# Patient Record
Sex: FEMALE | Race: WHITE | NOT HISPANIC OR LATINO | Employment: STUDENT | URBAN - METROPOLITAN AREA
[De-identification: names, ages, dates, MRNs, and addresses within clinical notes are randomized per-mention and may not be internally consistent; named-entity substitution may affect disease eponyms.]

---
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
## 2021-05-18 ENCOUNTER — OFFICE VISIT (OUTPATIENT)
Dept: FAMILY MEDICINE CLINIC | Facility: CLINIC | Age: 21
End: 2021-05-18

## 2021-05-18 VITALS
TEMPERATURE: 98.7 F | HEART RATE: 84 BPM | DIASTOLIC BLOOD PRESSURE: 70 MMHG | HEIGHT: 64 IN | SYSTOLIC BLOOD PRESSURE: 100 MMHG | RESPIRATION RATE: 16 BRPM | BODY MASS INDEX: 21.68 KG/M2 | WEIGHT: 127 LBS

## 2021-05-18 DIAGNOSIS — Z00.00 ANNUAL PHYSICAL EXAM: Primary | ICD-10-CM

## 2021-05-18 PROCEDURE — 1036F TOBACCO NON-USER: CPT | Performed by: FAMILY MEDICINE

## 2021-05-18 PROCEDURE — 3008F BODY MASS INDEX DOCD: CPT | Performed by: FAMILY MEDICINE

## 2021-05-18 PROCEDURE — 99395 PREV VISIT EST AGE 18-39: CPT | Performed by: FAMILY MEDICINE

## 2021-05-18 PROCEDURE — 3725F SCREEN DEPRESSION PERFORMED: CPT | Performed by: FAMILY MEDICINE

## 2021-05-18 PROCEDURE — 86580 TB INTRADERMAL TEST: CPT

## 2021-05-18 NOTE — PROGRESS NOTES
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  - discussed with patient that I would recommend birth control and to make follow up to discuss further options  - patient was placed on winter previously but stopped due to side effects of dizziness  - receiving ppd today  - physical forms filled out for patient and returned at visit               Immunizations and preventive care screenings were discussed with patient today  Appropriate education was printed on patient's after visit summary  Counseling:   Alcohol/drug use: discussed moderation in alcohol intake, the recommendations for healthy alcohol use, and avoidance of illicit drug use  Dental Health: discussed importance of regular tooth brushing, flossing, and dental visits  Injury prevention: discussed safety/seat belts, safety helmets, smoke detectors, carbon dioxide detectors, and smoking near bedding or upholstery  Sexual health: discussed sexually transmitted diseases, partner selection, use of condoms, avoidance of unintended pregnancy, and contraceptive alternatives  · Exercise: the importance of regular exercise/physical activity was discussed  Recommend exercise 3-5 times per week for at least 30 minutes  Return in about 1 year (around 2022) for Annual physical      Chief Complaint:     Chief Complaint   Patient presents with    Physical Exam      History of Present Illness:     Adult Annual Physical   Patient here for a comprehensive physical exam  The patient reports problems - continued abdominal sharp stabbing pain following meals    Patient has tried probiotic, omeprazole, and ranitidine without lasting improvement in pain  She admits to worsening pain with potatoes, tomatoes, and dairy  She would like a referral to GI  Denies black tarry stool, blood in bm, or hematemesis  Patient had an EGD in 2017 which was normal and negative for h pylori  She states that the pain is getting worse over the years  Diet and Physical Activity  · Diet/Nutrition: well balanced diet and consuming 3-5 servings of fruits/vegetables daily  · Exercise: walking, 5-7 times a week on average and 30-60 minutes on average  Depression Screening  PHQ-9 Depression Screening    PHQ-9:   Frequency of the following problems over the past two weeks:      Little interest or pleasure in doing things: 0 - not at all  Feeling down, depressed, or hopeless: 0 - not at all  PHQ-2 Score: 0       General Health  · Sleep: sleeps well and gets 7-8 hours of sleep on average  · Hearing: normal - bilateral   · Vision: no vision problems  · Dental: no dental visits for >1 year, brushes teeth twice daily and flosses teeth occasionally  /GYN Health  · Last menstrual period: 4/20/2021  · Contraceptive method: none  · History of STDs?: no      Review of Systems:     Review of Systems   Constitutional: Negative for activity change, chills, diaphoresis and fever  HENT: Negative for ear pain, hearing loss, postnasal drip, rhinorrhea, sinus pressure, sinus pain, sneezing and sore throat  Respiratory: Negative for cough, chest tightness, shortness of breath and wheezing  Cardiovascular: Negative for chest pain, palpitations and leg swelling  Gastrointestinal: Negative for abdominal pain, blood in stool, constipation, diarrhea, nausea and vomiting  Genitourinary: Negative for dysuria, frequency, hematuria and urgency  Musculoskeletal: Negative for arthralgias and myalgias  Neurological: Negative for dizziness, syncope, weakness, light-headedness, numbness and headaches        Past Medical History:     Past Medical History: Diagnosis Date    Concussion     last assessed: 10/29/2014      Past Surgical History:     Past Surgical History:   Procedure Laterality Date    CT EGD TRANSORAL BIOPSY SINGLE/MULTIPLE N/A 2/28/2017    Procedure: ESOPHAGOGASTRODUODENOSCOPY (EGD); Surgeon: Sonya Candelario MD;  [facility]: [facility];   Service: Pediatric Gastrointestinal      Social History:     E-Cigarette/Vaping    E-Cigarette Use Never User      E-Cigarette/Vaping Substances    Nicotine No     THC No     CBD No     Flavoring No     Other No     Unknown No      Social History     Socioeconomic History    Marital status: Single     Spouse name: None    Number of children: None    Years of education: None    Highest education level: None   Occupational History    None   Social Needs    Financial resource strain: None    Food insecurity     Worry: None     Inability: None    Transportation needs     Medical: None     Non-medical: None   Tobacco Use    Smoking status: Never Smoker    Smokeless tobacco: Never Used   Substance and Sexual Activity    Alcohol use: No    Drug use: No    Sexual activity: Yes   Lifestyle    Physical activity     Days per week: None     Minutes per session: None    Stress: None   Relationships    Social connections     Talks on phone: None     Gets together: None     Attends Bahai service: None     Active member of club or organization: None     Attends meetings of clubs or organizations: None     Relationship status: None    Intimate partner violence     Fear of current or ex partner: None     Emotionally abused: None     Physically abused: None     Forced sexual activity: None   Other Topics Concern    None   Social History Narrative    Patient lives with mom,dad, and brother     Always wears seatbelt in car       Family History:     Family History   Problem Relation Age of Onset    Thyroid nodules Mother    [facility] Migraines Mother     Ovarian cancer Mother     Thyroid disease Mother     Cancer Maternal Grandmother     Thyroid nodules Maternal Grandmother     Brain cancer Family     Breast cancer Family     Stomach cancer Family     Hypertension Family       Current Medications:     No current outpatient medications on file  No current facility-administered medications for this visit  Allergies:     No Known Allergies   Physical Exam:     /70   Pulse 84   Temp 98 7 °F (37 1 °C) (Tympanic)   Resp 16   Ht 5' 3 7" (1 618 m)   Wt 57 6 kg (127 lb)   BMI 22 01 kg/m²     Physical Exam  Constitutional:       General: She is not in acute distress  Appearance: She is well-developed  She is not diaphoretic  HENT:      Head: Normocephalic and atraumatic  Mouth/Throat:      Pharynx: No oropharyngeal exudate  Eyes:      Pupils: Pupils are equal, round, and reactive to light  Neck:      Vascular: No JVD  Cardiovascular:      Rate and Rhythm: Normal rate and regular rhythm  Heart sounds: Normal heart sounds  No murmur  No friction rub  No gallop  Pulmonary:      Effort: Pulmonary effort is normal  No respiratory distress  Breath sounds: Normal breath sounds  No wheezing or rales  Chest:      Chest wall: No tenderness  Abdominal:      General: Bowel sounds are normal  There is no distension  Palpations: Abdomen is soft  Tenderness: There is no abdominal tenderness  There is no guarding or rebound  Musculoskeletal: Normal range of motion  General: No tenderness  Lymphadenopathy:      Cervical: No cervical adenopathy  Skin:     General: Skin is warm and dry  Neurological:      Mental Status: She is alert and oriented to person, place, and time  Cranial Nerves: No cranial nerve deficit     Psychiatric:         Behavior: Behavior normal           Michael Faulkner DO   [id number] [place] [address]

## 2021-05-18 NOTE — PATIENT INSTRUCTIONS

## 2021-05-18 NOTE — ASSESSMENT & PLAN NOTE
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

## 2021-05-20 ENCOUNTER — CLINICAL SUPPORT (OUTPATIENT)
Dept: FAMILY MEDICINE CLINIC | Facility: CLINIC | Age: 21
End: 2021-05-20

## 2021-05-20 ENCOUNTER — TELEPHONE (OUTPATIENT)
Dept: FAMILY MEDICINE CLINIC | Facility: CLINIC | Age: 21
End: 2021-05-20

## 2021-05-20 DIAGNOSIS — K29.60 REFLUX GASTRITIS: Primary | ICD-10-CM

## 2021-05-20 DIAGNOSIS — Z11.1 ENCOUNTER FOR PPD TEST: Primary | ICD-10-CM

## 2021-05-20 DIAGNOSIS — K30 INDIGESTION: ICD-10-CM

## 2021-05-20 LAB
INDURATION: 0 MM
TB SKIN TEST: NEGATIVE

## 2021-05-20 NOTE — TELEPHONE ENCOUNTER
Patient recently seen, came today for PPD read, states that she had discussed with you seeing G  I  Reviewed chart note, concerns documented, issued G  I  referral to patient

## 2021-06-30 ENCOUNTER — OFFICE VISIT (OUTPATIENT)
Dept: GASTROENTEROLOGY | Facility: CLINIC | Age: 21
End: 2021-06-30
Payer: COMMERCIAL

## 2021-06-30 VITALS
HEART RATE: 73 BPM | WEIGHT: 124.8 LBS | DIASTOLIC BLOOD PRESSURE: 60 MMHG | HEIGHT: 64 IN | BODY MASS INDEX: 21.31 KG/M2 | TEMPERATURE: 98.3 F | SYSTOLIC BLOOD PRESSURE: 96 MMHG

## 2021-06-30 DIAGNOSIS — R10.9 ABDOMINAL PAIN, UNSPECIFIED ABDOMINAL LOCATION: ICD-10-CM

## 2021-06-30 DIAGNOSIS — K30 INDIGESTION: ICD-10-CM

## 2021-06-30 DIAGNOSIS — K29.60 REFLUX GASTRITIS: ICD-10-CM

## 2021-06-30 DIAGNOSIS — R19.4 CHANGE IN BOWEL HABITS: Primary | ICD-10-CM

## 2021-06-30 PROCEDURE — 99243 OFF/OP CNSLTJ NEW/EST LOW 30: CPT | Performed by: PHYSICIAN ASSISTANT

## 2021-06-30 PROCEDURE — 1036F TOBACCO NON-USER: CPT | Performed by: PHYSICIAN ASSISTANT

## 2021-06-30 PROCEDURE — 3008F BODY MASS INDEX DOCD: CPT | Performed by: PHYSICIAN ASSISTANT

## 2021-06-30 PROCEDURE — 3008F BODY MASS INDEX DOCD: CPT | Performed by: FAMILY MEDICINE

## 2021-06-30 RX ORDER — DICYCLOMINE HYDROCHLORIDE 10 MG/1
10 CAPSULE ORAL
Qty: 120 CAPSULE | Refills: 1 | Status: SHIPPED | OUTPATIENT
Start: 2021-06-30

## 2021-06-30 NOTE — PROGRESS NOTES
[facility] - Outpatient Consultation  Flor Paige 22 y o  female MRN: [id number]  Encounter: [id number]          ASSESSMENT AND PLAN:      Feliciano Naqvi is a 23 y/o female here for consultation of GERD  1  Generalized abdominal pain  2  Dyspepsia  Pt complains of post-prandial bloating, nausea without vomiting, and abdominal pain that is generalzied and without radiation  These symptoms have been intermittent x 4 years and she says dairy is usually a trigger, but she has noticed that "almost any" type of food triggers these symptoms, as well  Patient denies any heartburn or epigastric/chest pain  EGD 2017 for abdominal pain WNL, but pt says these symptoms are "different" in that it occur after eating  Pt is requesting repeat EGD    -TSH, Celiac, CMP ordered  -RUQ u/s ordered to rule out gallbladder/biliary dysfunction  -as per request, EGD ordered to rule out H pylori, celiac, uncontrolled reflux  -start OTC pepcid daily as silent reflux may be the cause  -start Bentyl 10 mg PRN abdominal pain  -educated pt about starting low FODMAP diet  -OTC GAS-X    3  Alternating Bowel Habits   Pt says that in Feb 2021, she noticed that her BMs were loose-watery most days however she says she only has 1 BM every day-every other day  Prior to this, she says her BMs were formed and did not have issues with moving her bowels  She says that on days she does not drink enough water, she gets constipated  Pt denies sick contacts, recent travel, recent Antibiotic use, or microbial infection prior to this starting  Pt denies family hx of colo-rectal cancer and denies any bloody or black stools   Pt would like a colonoscopy at this time rather than conservative tx   -as per request, colonoscopy ordered; instructions given per office staff; risks and benefits reviewed  -TSH and celiac already ordered for above symptoms   -low FODMAP diet  -start daily fiber supplement, about 20 g/day  -increase daily water to at least 59 ounces/day  -increase daily activity   ______________________________________________________________________    HPI:  Ada Lee is a 23 y/o female here for consultation of abdominal pain  She had an EGD in 2017 for abdominal pain, which was WNL  Patient says that for the last 3-4 years, she has had intermittent abdominal pain that occurs after eating  She says the pain is geenralzied throughout the abdomen without radiation  Patient says that she does get associated bloating and nausea without vomiting, but denies any: heartburn, dysphagia, early fullness, vomiting  She says that she has noticed that it usually occurs after eating dairy, but it also occurs after eating "almost anything " Patient says that she goes a few months with these symptoms, then she will go about 1-2 months without any discomfort after eating  She says she believed she tries omeprazole in the past but does not know why she tried it or how long ago she tried it  Patient also says that in February, she started to notice that she started to have loose stools almost every time she would move her bowels  She says she only moves her bowels about once daily-every other day, but they are loose/wattery  Patient says that she has also noticed that on the days she does not drink enough water, she gets constipated but this is infrequent  She said prior to this, she never had any issues moving her bowels and that they were always formed  Patient denies any illness or infections prior to this starting and denies any recent travel, sick contact, or Abx usage  She denies any bloody or black stools  She denies any family hx of colo-rectal cancer and denies any: unintentional weight loss, fevers, chills  REVIEW OF SYSTEMS:    CONSTITUTIONAL: Denies any fever, chills, rigors, and weight loss  HEENT: No earache or tinnitus  Denies hearing loss or visual disturbances  CARDIOVASCULAR: No chest pain or palpitations     RESPIRATORY: Denies any cough, hemoptysis, shortness of breath or dyspnea on exertion  GASTROINTESTINAL: As noted in the History of Present Illness  GENITOURINARY: No problems with urination  Denies any hematuria or dysuria  NEUROLOGIC: No dizziness or vertigo, denies headaches  MUSCULOSKELETAL: Denies any muscle or joint pain  SKIN: Denies skin rashes or itching  ENDOCRINE: Denies excessive thirst  Denies intolerance to heat or cold  PSYCHOSOCIAL: Denies depression or anxiety  Denies any recent memory loss  Historical Information   Past Medical History:   Diagnosis Date    Concussion     last assessed: 10/29/2014     Past Surgical History:   Procedure Laterality Date    [place] EGD TRANSORAL BIOPSY SINGLE/MULTIPLE N/A 2/28/2017    Procedure: ESOPHAGOGASTRODUODENOSCOPY (EGD); Surgeon: Miguel Kemp MD;  Location: [facility]; Service: Pediatric Gastrointestinal     Social History   Social History     Substance and Sexual Activity   Alcohol Use No     Social History     Substance and Sexual Activity   Drug Use No     Social History     Tobacco Use   Smoking Status Never Smoker   Smokeless Tobacco Never Used     Family History   Problem Relation Age of Onset    Thyroid nodules Mother    [facility] Migraines Mother     Ovarian cancer Mother     Thyroid disease Mother     Cancer Maternal Grandmother     Thyroid nodules Maternal Grandmother     Brain cancer Family     Breast cancer Family     Stomach cancer Family     Hypertension Family        Meds/Allergies     No current outpatient medications on file  No Known Allergies        Objective     There were no vitals taken for this visit  There is no height or weight on file to calculate BMI          PHYSICAL EXAM:      General Appearance:   Alert, cooperative, no distress   HEENT:   Normocephalic, atraumatic, anicteric      Neck:  Supple, symmetrical, trachea midline   Lungs:   Clear to auscultation bilaterally; no rales, rhonchi or wheezing; respirations unlabored  Heart[de-identified]   Regular rate and rhythm; no murmur, rub, or gallop  Abdomen:   Soft, non-tender, non-distended; normal bowel sounds; no masses, no organomegaly    Genitalia:   Deferred    Rectal:   Deferred    Extremities:  No cyanosis, clubbing or edema    Pulses:  2+ and symmetric    Skin:  No jaundice, rashes, or lesions    Lymph nodes:  No palpable cervical lymphadenopathy        Lab Results:   No visits with results within 1 Day(s) from this visit  Latest known visit with results is:   Clinical Support on 05/20/2021   Component Date Value    TB Skin Test 05/20/2021 Negative     Induration 05/20/2021 0          Radiology Results:   No results found

## 2021-06-30 NOTE — PATIENT INSTRUCTIONS
1  Start OTC pepcid and take every morning   2  Start bentyl as needed for the pain   3  Continue to avoid dairy   4  Do blood work   5  Start daily fiber supplement (over the counter metamucil)   6  Drink at least 64 ounces of water/day   7  Increase activity   8  Low FODMAP diet  9  Take OTC GAS-X     Colon/egd scheduled 08/24/21 @[facility] with Dr Donnie Calvin  Miralax/dulcolax instructions given to pt by Charline Sheets in the office      U/S schedul;ed for 07/02/21 [facility]

## 2021-07-02 ENCOUNTER — HOSPITAL ENCOUNTER (OUTPATIENT)
Dept: ULTRASOUND IMAGING | Facility: HOSPITAL | Age: 21
Discharge: HOME | End: 2021-07-02
Payer: COMMERCIAL

## 2021-07-02 ENCOUNTER — APPOINTMENT (OUTPATIENT)
Dept: LAB | Facility: HOSPITAL | Age: 21
End: 2021-07-02
Payer: COMMERCIAL

## 2021-07-02 DIAGNOSIS — R10.9 ABDOMINAL PAIN, UNSPECIFIED ABDOMINAL LOCATION: ICD-10-CM

## 2021-07-02 DIAGNOSIS — K30 INDIGESTION: ICD-10-CM

## 2021-07-02 LAB
ALBUMIN SERPL BCP-MCNC: 4.6 G/DL (ref 3.4–4.8)
ALP SERPL-CCNC: 55.2 U/L (ref 35–140)
ALT SERPL W P-5'-P-CCNC: 8 U/L (ref 5–54)
ANION GAP SERPL CALCULATED.3IONS-SCNC: 7 MMOL/L (ref 4–13)
AST SERPL W P-5'-P-CCNC: 13 U/L (ref 15–41)
BILIRUB SERPL-MCNC: 1.37 MG/DL (ref 0.3–1.2)
BUN SERPL-MCNC: 12 MG/DL (ref 6–20)
CALCIUM SERPL-MCNC: 9.5 MG/DL (ref 8.4–10.2)
CHLORIDE SERPL-SCNC: 105 MMOL/L (ref 96–108)
CO2 SERPL-SCNC: 29 MMOL/L (ref 22–33)
CREAT SERPL-MCNC: 0.71 MG/DL (ref 0.4–1.1)
GFR SERPL CREATININE-BSD FRML MDRD: 123 ML/MIN/1.73SQ M
GLUCOSE P FAST SERPL-MCNC: 75 MG/DL (ref 70–105)
HCG SERPL QL: NEGATIVE
POTASSIUM SERPL-SCNC: 4.4 MMOL/L (ref 3.5–5)
PROT SERPL-MCNC: 7.4 G/DL (ref 6.4–8.3)
SODIUM SERPL-SCNC: 141 MMOL/L (ref 133–145)
TSH SERPL DL<=0.05 MIU/L-ACNC: 1.94 UIU/ML (ref 0.34–5.6)

## 2021-07-02 PROCEDURE — 80053 COMPREHEN METABOLIC PANEL: CPT

## 2021-07-02 PROCEDURE — 84703 CHORIONIC GONADOTROPIN ASSAY: CPT

## 2021-07-02 PROCEDURE — 82784 ASSAY IGA/IGD/IGG/IGM EACH: CPT

## 2021-07-02 PROCEDURE — 76705 ECHO EXAM OF ABDOMEN: CPT

## 2021-07-02 PROCEDURE — 83516 IMMUNOASSAY NONANTIBODY: CPT

## 2021-07-02 PROCEDURE — 86255 FLUORESCENT ANTIBODY SCREEN: CPT

## 2021-07-02 PROCEDURE — 36415 COLL VENOUS BLD VENIPUNCTURE: CPT

## 2021-07-02 PROCEDURE — 84443 ASSAY THYROID STIM HORMONE: CPT

## 2021-07-03 LAB
ENDOMYSIUM IGA SER QL: NEGATIVE
GLIADIN PEPTIDE IGA SER-ACNC: 3 UNITS (ref 0–19)
GLIADIN PEPTIDE IGG SER-ACNC: 3 UNITS (ref 0–19)
IGA SERPL-MCNC: 163 MG/DL (ref 87–352)
TTG IGA SER-ACNC: <2 U/ML (ref 0–3)
TTG IGG SER-ACNC: 3 U/ML (ref 0–5)

## 2021-08-23 ENCOUNTER — TELEPHONE (OUTPATIENT)
Dept: GASTROENTEROLOGY | Facility: AMBULARY SURGERY CENTER | Age: 21
End: 2021-08-23

## 2021-08-24 ENCOUNTER — ANESTHESIA EVENT (OUTPATIENT)
Dept: GASTROENTEROLOGY | Facility: AMBULARY SURGERY CENTER | Age: 21
End: 2021-08-24

## 2021-08-24 ENCOUNTER — ANESTHESIA (OUTPATIENT)
Dept: GASTROENTEROLOGY | Facility: AMBULARY SURGERY CENTER | Age: 21
End: 2021-08-24

## 2021-08-24 ENCOUNTER — HOSPITAL ENCOUNTER (OUTPATIENT)
Dept: GASTROENTEROLOGY | Facility: AMBULARY SURGERY CENTER | Age: 21
Setting detail: OUTPATIENT SURGERY
Discharge: HOME | End: 2021-08-24
Payer: COMMERCIAL

## 2021-08-24 VITALS
HEIGHT: 64 IN | HEART RATE: 76 BPM | SYSTOLIC BLOOD PRESSURE: 116 MMHG | WEIGHT: 124 LBS | DIASTOLIC BLOOD PRESSURE: 66 MMHG | RESPIRATION RATE: 16 BRPM | OXYGEN SATURATION: 100 % | BODY MASS INDEX: 21.17 KG/M2 | TEMPERATURE: 98.6 F

## 2021-08-24 DIAGNOSIS — K30 INDIGESTION: ICD-10-CM

## 2021-08-24 DIAGNOSIS — K29.60 REFLUX GASTRITIS: ICD-10-CM

## 2021-08-24 DIAGNOSIS — R19.4 CHANGE IN BOWEL HABITS: ICD-10-CM

## 2021-08-24 LAB
EXT PREGNANCY TEST URINE: NEGATIVE
EXT. CONTROL: NORMAL

## 2021-08-24 PROCEDURE — 88305 TISSUE EXAM BY PATHOLOGIST: CPT | Performed by: PATHOLOGY

## 2021-08-24 PROCEDURE — 45378 DIAGNOSTIC COLONOSCOPY: CPT | Performed by: INTERNAL MEDICINE

## 2021-08-24 PROCEDURE — 43239 EGD BIOPSY SINGLE/MULTIPLE: CPT | Performed by: INTERNAL MEDICINE

## 2021-08-24 PROCEDURE — 81025 URINE PREGNANCY TEST: CPT | Performed by: ANESTHESIOLOGY

## 2021-08-24 RX ORDER — PROPOFOL 10 MG/ML
INJECTION, EMULSION INTRAVENOUS AS NEEDED
Status: DISCONTINUED | OUTPATIENT
Start: 2021-08-24 | End: 2021-08-24

## 2021-08-24 RX ORDER — LIDOCAINE HYDROCHLORIDE 10 MG/ML
INJECTION, SOLUTION EPIDURAL; INFILTRATION; INTRACAUDAL; PERINEURAL AS NEEDED
Status: DISCONTINUED | OUTPATIENT
Start: 2021-08-24 | End: 2021-08-24

## 2021-08-24 RX ORDER — SODIUM CHLORIDE, SODIUM LACTATE, POTASSIUM CHLORIDE, CALCIUM CHLORIDE 600; 310; 30; 20 MG/100ML; MG/100ML; MG/100ML; MG/100ML
INJECTION, SOLUTION INTRAVENOUS CONTINUOUS PRN
Status: DISCONTINUED | OUTPATIENT
Start: 2021-08-24 | End: 2021-08-24

## 2021-08-24 RX ORDER — SODIUM CHLORIDE, SODIUM LACTATE, POTASSIUM CHLORIDE, CALCIUM CHLORIDE 600; 310; 30; 20 MG/100ML; MG/100ML; MG/100ML; MG/100ML
50 INJECTION, SOLUTION INTRAVENOUS CONTINUOUS
Status: DISCONTINUED | OUTPATIENT
Start: 2021-08-24 | End: 2021-08-28 | Stop reason: HOSPADM

## 2021-08-24 RX ORDER — PROPOFOL 10 MG/ML
INJECTION, EMULSION INTRAVENOUS CONTINUOUS PRN
Status: DISCONTINUED | OUTPATIENT
Start: 2021-08-24 | End: 2021-08-24

## 2021-08-24 RX ADMIN — SODIUM CHLORIDE, SODIUM LACTATE, POTASSIUM CHLORIDE, AND CALCIUM CHLORIDE: .6; .31; .03; .02 INJECTION, SOLUTION INTRAVENOUS at 10:15

## 2021-08-24 RX ADMIN — PROPOFOL 100 MG: 10 INJECTION, EMULSION INTRAVENOUS at 10:17

## 2021-08-24 RX ADMIN — SODIUM CHLORIDE, SODIUM LACTATE, POTASSIUM CHLORIDE, AND CALCIUM CHLORIDE: .6; .31; .03; .02 INJECTION, SOLUTION INTRAVENOUS at 10:14

## 2021-08-24 RX ADMIN — PROPOFOL 140 MCG/KG/MIN: 10 INJECTION, EMULSION INTRAVENOUS at 10:17

## 2021-08-24 RX ADMIN — LIDOCAINE HYDROCHLORIDE 50 MG: 10 INJECTION, SOLUTION EPIDURAL; INFILTRATION; INTRACAUDAL at 10:17

## 2021-08-24 NOTE — ANESTHESIA POSTPROCEDURE EVALUATION
Post-Op Assessment Note    CV Status:  Stable  Pain Score: 0    Pain management: adequate     Mental Status:  Alert and awake   Hydration Status:  Stable and euvolemic   PONV Controlled:  Controlled   Airway Patency:  Patent and adequate      Post Op Vitals Reviewed: Yes      Staff: CRNA         No complications documented      BP   108/55   Temp     Pulse  89   Resp   20   SpO2   99

## 2021-08-24 NOTE — ANESTHESIA PREPROCEDURE EVALUATION
Procedure:  COLONOSCOPY  EGD    Relevant Problems   NEURO/PSYCH   (+) Headache      PULMONARY   (+) Acute upper respiratory infection      GERD    Physical Exam    Airway    Mallampati score: II  TM Distance: >3 FB  Neck ROM: full     Dental       Cardiovascular      Pulmonary      Other Findings        Anesthesia Plan  ASA Score- 2     Anesthesia Type- IV sedation with anesthesia with ASA Monitors  Additional Monitors:   Airway Plan:           Plan Factors-    Chart reviewed  Induction- intravenous  Postoperative Plan-     Informed Consent- Anesthetic plan and risks discussed with patient  I personally reviewed this patient with the CRNA  Discussed and agreed on the Anesthesia Plan with the ARNOLD Pinzon

## 2021-08-24 NOTE — H&P
History and Physical -  [facility]  Bigg Mccain 22 y o  female MRN: [id number]                  HPI: Bigg Mccain is a 22y o  year old female who presents for dyspepsia and change in bowel habits  For upper endoscopy and colonoscopy  REVIEW OF SYSTEMS: Per the HPI, and otherwise unremarkable  Historical Information   Past Medical History:   Diagnosis Date    Concussion     last assessed: 10/29/2014     Past Surgical History:   Procedure Laterality Date    [place] EGD TRANSORAL BIOPSY SINGLE/MULTIPLE N/A 2/28/2017    Procedure: ESOPHAGOGASTRODUODENOSCOPY (EGD); Surgeon: Tod Boas, MD;  [facility]: [facility]; Service: Pediatric Gastrointestinal    UPPER GASTROINTESTINAL ENDOSCOPY  2017     Social History   Social History     Substance and Sexual Activity   Alcohol Use No     Social History     Substance and Sexual Activity   Drug Use No     Social History     Tobacco Use   Smoking Status Never Smoker   Smokeless Tobacco Never Used     Family History   Problem Relation Age of Onset    Thyroid nodules Mother    [facility] Migraines Mother     Ovarian cancer Mother     Thyroid disease Mother     Cancer Maternal Grandmother     Thyroid nodules Maternal Grandmother     Brain cancer Family     Breast cancer Family     Stomach cancer Family     Hypertension Family     Kidney disease Father     Stomach cancer Paternal Grandmother        Meds/Allergies       Current Outpatient Medications:     dicyclomine (BENTYL) 10 mg capsule    No Known Allergies    Objective     There were no vitals taken for this visit  PHYSICAL EXAM    Gen: NAD  Head: NCAT  CV: RRR  CHEST: Clear  ABD: soft, NT/ND  EXT: no edema      ASSESSMENT/PLAN:  This is a 22y o  year old female here for EGD and colonoscopy, and she is stable and optimized for her procedure

## 2021-08-30 ENCOUNTER — TELEPHONE (OUTPATIENT)
Dept: FAMILY MEDICINE CLINIC | Facility: CLINIC | Age: 21
End: 2021-08-30

## 2021-08-30 NOTE — TELEPHONE ENCOUNTER
Patient dropped of [facility] Form for TB test results to be completed  Per Nurse [address] [place] [address] Dr Madrid and given to nurse as well for Dr María Mendez on behalf of Dr Jessica Salvador last [facility] visit

## 2021-10-21 ENCOUNTER — TELEPHONE (OUTPATIENT)
Dept: GASTROENTEROLOGY | Facility: CLINIC | Age: 21
End: 2021-10-21

## 2021-10-22 ENCOUNTER — OFFICE VISIT (OUTPATIENT)
Dept: GASTROENTEROLOGY | Facility: CLINIC | Age: 21
End: 2021-10-22
Payer: COMMERCIAL

## 2021-10-22 VITALS
BODY MASS INDEX: 20.16 KG/M2 | TEMPERATURE: 97.5 F | SYSTOLIC BLOOD PRESSURE: 112 MMHG | HEART RATE: 67 BPM | HEIGHT: 65 IN | DIASTOLIC BLOOD PRESSURE: 70 MMHG | OXYGEN SATURATION: 98 % | WEIGHT: 121 LBS

## 2021-10-22 DIAGNOSIS — K59.00 CONSTIPATION, UNSPECIFIED CONSTIPATION TYPE: ICD-10-CM

## 2021-10-22 DIAGNOSIS — R10.13 DYSPEPSIA: Primary | ICD-10-CM

## 2021-10-22 PROCEDURE — 1036F TOBACCO NON-USER: CPT | Performed by: PHYSICIAN ASSISTANT

## 2021-10-22 PROCEDURE — 99213 OFFICE O/P EST LOW 20 MIN: CPT | Performed by: PHYSICIAN ASSISTANT

## 2021-10-22 PROCEDURE — 3008F BODY MASS INDEX DOCD: CPT | Performed by: PHYSICIAN ASSISTANT

## 2022-07-02 NOTE — PROGRESS NOTES
Merlin Jensen 2000 female MRN: [id number]    Family Medicine Acute Visit    ASSESSMENT/PLAN   Problem List Items Addressed This Visit        Respiratory    Acute upper respiratory infection - Primary     Supportive care for likely viral URI  Off school today  RTC in one week if symptoms fail to improve  No future appointments  SUBJECTIVE  CC: Sore Throat; Headache; and Earache      HPI:  Merlin Jensen is a 13 y o  female who presents for sore throat  Has had sore throat for approx 12 hrs  Has had some fatigue, also now with runny nose, cough (productive of brown mucous), HA, chest pain with coughing  Took some cough drops without relief  No myalgias  No hemoptysis  No chills  +ear pain  Review of Systems   Constitutional: Positive for activity change and fatigue  Negative for chills and fever  HENT: Positive for congestion, ear pain, rhinorrhea, sinus pressure, sneezing and sore throat  Negative for ear discharge and hearing loss  Eyes: Negative for discharge  Respiratory: Positive for cough  Negative for shortness of breath  Cardiovascular: Negative for chest pain  Gastrointestinal: Negative for abdominal pain, diarrhea, nausea and vomiting  Musculoskeletal: Negative for myalgias  Neurological: Positive for headaches  Historical Information   The patient history was reviewed as follows:  History reviewed  No pertinent past medical history  Past Surgical History:   Procedure Laterality Date    [place] EGD TRANSORAL BIOPSY SINGLE/MULTIPLE N/A 2/28/2017    Procedure: ESOPHAGOGASTRODUODENOSCOPY (EGD); Surgeon: Benjamin Cummings MD;  [facility]; Service: Pediatric Gastrointestinal     No family history on file     Social History   History   Alcohol use Not on file     History   Drug use: Unknown     History   Smoking Status    Never Smoker   Smokeless Tobacco    Never Used       Medications:     Current Outpatient Prescriptions:    omeprazole (PriLOSEC) 40 MG capsule, Take 40 mg by mouth daily, Disp: , Rfl:     No Known Allergies    OBJECTIVE  Vitals:   Vitals:    04/09/18 1001   BP: 112/70   BP Location: Left arm   Patient Position: Sitting   Cuff Size: Standard   Pulse: 88   Resp: 16   Temp: (!) 100 2 °F (37 9 °C)   TempSrc: Tympanic   Weight: 63 kg (138 lb 12 8 oz)   Height: 5' 3 3" (1 608 m)         Physical Exam   Constitutional: She is oriented to person, place, and time  She appears well-developed and well-nourished  No distress  HENT:   Head: Normocephalic and atraumatic  Right Ear: External ear normal    Left Ear: External ear normal    Nose: Nose normal    Mouth/Throat: No oropharyngeal exudate  Mild erythema of oropharynx  Eyes: Pupils are equal, round, and reactive to light  Neck: Normal range of motion  Neck supple  Cardiovascular: Normal rate, regular rhythm and normal heart sounds  Exam reveals no gallop and no friction rub  No murmur heard  Pulmonary/Chest: Effort normal and breath sounds normal  No respiratory distress  She has no wheezes  She has no rales  Abdominal: Soft  She exhibits no distension  Musculoskeletal: Normal range of motion  Lymphadenopathy:     She has no cervical adenopathy  Neurological: She is alert and oriented to person, place, and time  Skin: Skin is warm and dry  Psychiatric: She has a normal mood and affect   Her behavior is normal  Thought content normal                   Ten Amaro MD, PGY-2  [facility]   4/9/2018 room air

## 2022-08-17 ENCOUNTER — TELEPHONE (OUTPATIENT)
Dept: FAMILY MEDICINE CLINIC | Facility: CLINIC | Age: 22
End: 2022-08-17

## 2022-08-17 ENCOUNTER — OFFICE VISIT (OUTPATIENT)
Dept: FAMILY MEDICINE CLINIC | Facility: CLINIC | Age: 22
End: 2022-08-17

## 2022-08-17 VITALS
BODY MASS INDEX: 21.82 KG/M2 | DIASTOLIC BLOOD PRESSURE: 72 MMHG | OXYGEN SATURATION: 100 % | WEIGHT: 127.8 LBS | TEMPERATURE: 98.2 F | HEIGHT: 64 IN | HEART RATE: 87 BPM | SYSTOLIC BLOOD PRESSURE: 112 MMHG | RESPIRATION RATE: 16 BRPM

## 2022-08-17 DIAGNOSIS — Z11.4 SCREENING FOR HIV (HUMAN IMMUNODEFICIENCY VIRUS): ICD-10-CM

## 2022-08-17 DIAGNOSIS — Z11.3 SCREENING FOR STDS (SEXUALLY TRANSMITTED DISEASES): ICD-10-CM

## 2022-08-17 DIAGNOSIS — Z13.6 ENCOUNTER FOR LIPID SCREENING FOR CARDIOVASCULAR DISEASE: ICD-10-CM

## 2022-08-17 DIAGNOSIS — Z00.00 ANNUAL PHYSICAL EXAM: Primary | ICD-10-CM

## 2022-08-17 DIAGNOSIS — Z23 ENCOUNTER FOR IMMUNIZATION: ICD-10-CM

## 2022-08-17 DIAGNOSIS — Z13.220 ENCOUNTER FOR LIPID SCREENING FOR CARDIOVASCULAR DISEASE: ICD-10-CM

## 2022-08-17 DIAGNOSIS — Z11.1 SCREENING-PULMONARY TB: ICD-10-CM

## 2022-08-17 DIAGNOSIS — Z11.59 NEED FOR HEPATITIS C SCREENING TEST: ICD-10-CM

## 2022-08-17 PROCEDURE — 90715 TDAP VACCINE 7 YRS/> IM: CPT | Performed by: FAMILY MEDICINE

## 2022-08-17 PROCEDURE — 99395 PREV VISIT EST AGE 18-39: CPT | Performed by: FAMILY MEDICINE

## 2022-08-17 PROCEDURE — 3725F SCREEN DEPRESSION PERFORMED: CPT | Performed by: FAMILY MEDICINE

## 2022-08-17 PROCEDURE — 90471 IMMUNIZATION ADMIN: CPT | Performed by: FAMILY MEDICINE

## 2022-08-17 NOTE — PROGRESS NOTES
[address] [facility]    NAME: Ludy Chappell  AGE: 25 y o  SEX: female  : 2000     DATE: 2022     Assessment and Plan:     Problem List Items Addressed This Visit        Other    Annual physical exam - Primary     - Screening for cardiovascular disease: yearly Lipid panel ordered today   - Screening for cervical cancer: Advised to schedule appt for Well Women exam with Pap smear  - Screening for breast cancer: not indicated  - Vaccinations: Tdap given today  - Hep C and HIV screenings ordered today  - Depression screening: PHQ2 is negative  - Counseled the patient on healthy lifestyle habits             Other Visit Diagnoses     Screening for HIV (human immunodeficiency virus)        Relevant Orders    HIV 1/2 Antigen/Antibody (4th Generation) w Reflex [facility]    Need for hepatitis C screening test        Relevant Orders    Hepatitis C antibody    Screening for STDs (sexually transmitted diseases)        Encounter for immunization        Relevant Orders    TDAP VACCINE GREATER THAN OR EQUAL TO 8YO IM (Completed)    Encounter for lipid screening for cardiovascular disease        Relevant Orders    Lipid panel    Screening-pulmonary TB        Relevant Orders    Quantiferon TB Gold Plus          Immunizations and preventive care screenings were discussed with patient today  Appropriate education was printed on patient's after visit summary  Counseling:  Dental Health: discussed importance of regular tooth brushing, flossing, and dental visits  Injury prevention: discussed safety/seat belts, safety helmets, smoke detectors, carbon dioxide detectors, and smoking near bedding or upholstery  · Exercise: the importance of regular exercise/physical activity was discussed  Recommend exercise 3-5 times per week for at least 30 minutes         Depression Screening and Follow-up Plan: Patient was screened for depression during today's encounter  They screened negative with a PHQ-2 score of 0  Return in 1 year (on 8/17/2023)  Chief Complaint:     Chief Complaint   Patient presents with    Physical Exam     School, pt has no concerns      History of Present Illness:     Adult Annual Physical   Patient here for a comprehensive physical exam  The patient reports no problems  Diet and Physical Activity  · Diet/Nutrition: well balanced diet  · Exercise: 1-2 times a week on average  Depression Screening  PHQ-2/9 Depression Screening    Little interest or pleasure in doing things: 0 - not at all  Feeling down, depressed, or hopeless: 0 - not at all  PHQ-2 Score: 0  PHQ-2 Interpretation: Negative depression screen       General Health  · Sleep: sleeps well and gets 7-8 hours of sleep on average  · Hearing: normal - bilateral   · Vision: no vision problems  · Dental: no dental visits for >1 year and brushes teeth twice daily  /GYN Health  · Last menstrual period: 8/4/2022, irregular, 5 days  · Contraceptive method: none  · History of STDs?: no      Review of Systems:     Review of Systems   Constitutional: Negative for chills and fever  HENT: Negative for sore throat  Eyes: Negative for visual disturbance  Respiratory: Negative for cough and shortness of breath  Cardiovascular: Negative for chest pain and palpitations  Gastrointestinal: Negative for constipation, diarrhea, nausea and vomiting  Genitourinary: Negative for dysuria and hematuria  Musculoskeletal: Negative for gait problem  Psychiatric/Behavioral: Negative for dysphoric mood and sleep disturbance  All other systems reviewed and are negative       Past Medical History:     Past Medical History:   Diagnosis Date    Concussion     last assessed: 10/29/2014      Past Surgical History:     Past Surgical History:   Procedure Laterality Date    EGD AND COLONOSCOPY  2021    [place] EGD TRANSORAL BIOPSY SINGLE/MULTIPLE N/A 02/28/2017 Procedure: ESOPHAGOGASTRODUODENOSCOPY (EGD); Surgeon: Howard Campuzano MD;  Location:  GI LAB; Service: Pediatric Gastrointestinal    UPPER GASTROINTESTINAL ENDOSCOPY  2017      Social History:     Social History     Socioeconomic History    Marital status: Single     Spouse name: None    Number of children: None    Years of education: None    Highest education level: None   Occupational History    None   Tobacco Use    Smoking status: Never Smoker    Smokeless tobacco: Never Used   Vaping Use    Vaping Use: Never used   Substance and Sexual Activity    Alcohol use: Yes     Comment: Socially    Drug use: No    Sexual activity: Never   Other Topics Concern    None   Social History Narrative    Patient lives with mom,dad, and brother     Always wears seatbelt in car      Social Determinants of Health     Financial Resource Strain: Low Risk     Difficulty of Paying Living Expenses: Not hard at all   Food Insecurity: No Food Insecurity    Worried About [address] in the Last Year: Never true    [address] [employer] in the Last Year: Never true   Transportation Needs: No Transportation Needs    Lack of Transportation (Medical): No    Lack of Transportation (Non-Medical):  No   Physical Activity: Not on file   Stress: Not on file   Social Connections: Not on file   Intimate Partner Violence: Not At Risk    Fear of Current or Ex-Partner: No    Emotionally Abused: No    Physically Abused: No    Sexually Abused: No   Housing Stability: Low Risk     Unable to Pay for Housing in the Last Year: No    Number of Places Lived in the Last Year: 1    Unstable Housing in the Last Year: No      Family History:     Family History   Problem Relation Age of Onset    Thyroid nodules Mother     Migraines Mother     Ovarian cancer Mother     Thyroid disease Mother     Kidney disease Father     Thyroid nodules Maternal Grandmother     Brain cancer Maternal Grandmother     Cancer Maternal Grandmother  Stomach cancer Paternal Grandmother     Cancer Paternal Grandmother     Brain cancer Family     Breast cancer Family         Great Aunts    Stomach cancer Family     Hypertension Family       Current Medications:     No current outpatient medications on file  No current facility-administered medications for this visit  Allergies:     No Known Allergies   Physical Exam:     /72 (BP Location: Right arm, Patient Position: Sitting, Cuff Size: Standard)   Pulse 87   Temp 98 2 °F (36 8 °C) (Temporal)   Resp 16   Ht 5' 3 8" (1 621 m)   Wt 58 kg (127 lb 12 8 oz)   SpO2 100%   BMI 22 07 kg/m²     Physical Exam  Vitals and nursing note reviewed  Constitutional:       General: She is not in acute distress  Appearance: Normal appearance  She is well-developed  She is obese  She is not toxic-appearing  HENT:      Head: Normocephalic and atraumatic  Right Ear: Tympanic membrane, ear canal and external ear normal       Left Ear: Tympanic membrane, ear canal and external ear normal       Nose: Nose normal       Mouth/Throat:      Mouth: Mucous membranes are moist       Pharynx: Oropharynx is clear  Eyes:      Extraocular Movements: Extraocular movements intact  Conjunctiva/sclera: Conjunctivae normal       Pupils: Pupils are equal, round, and reactive to light  Cardiovascular:      Rate and Rhythm: Normal rate and regular rhythm  Heart sounds: Normal heart sounds  No murmur heard  No friction rub  No gallop  Pulmonary:      Effort: Pulmonary effort is normal  No respiratory distress  Breath sounds: Normal breath sounds  No wheezing, rhonchi or rales  Abdominal:      General: Bowel sounds are normal       Palpations: Abdomen is soft  Tenderness: There is no abdominal tenderness  Musculoskeletal:         General: Normal range of motion  Cervical back: Neck supple  Right lower leg: No edema  Left lower leg: No edema     Lymphadenopathy: Cervical: No cervical adenopathy  Skin:     General: Skin is warm and dry  Neurological:      General: No focal deficit present  Mental Status: She is alert  Gait: Gait normal    Psychiatric:         Mood and Affect: Mood normal          Behavior: Behavior normal          Thought Content:  Thought content normal          Judgment: Judgment normal           Jessica Sher DO   [address]

## 2022-08-17 NOTE — ASSESSMENT & PLAN NOTE
- Screening for cardiovascular disease: yearly Lipid panel ordered today   - Screening for cervical cancer: Advised to schedule appt for Well Women exam with Pap smear  - Screening for breast cancer: not indicated  - Vaccinations:  Tdap given today  - Hep C and HIV screenings ordered today  - Depression screening: PHQ2 is negative  - Counseled the patient on healthy lifestyle habits

## 2022-08-17 NOTE — TELEPHONE ENCOUNTER
[employer] [facility] Team) -   Dr Leena Christopher    Name of [address] Form      Form to be given   To patient after visit    Patient was made aware of the 7 business day form policy

## 2022-08-17 NOTE — PATIENT INSTRUCTIONS

## 2022-08-18 ENCOUNTER — APPOINTMENT (OUTPATIENT)
Dept: LAB | Facility: CLINIC | Age: 22
End: 2022-08-18
Payer: COMMERCIAL

## 2022-08-18 ENCOUNTER — TELEPHONE (OUTPATIENT)
Dept: FAMILY MEDICINE CLINIC | Facility: CLINIC | Age: 22
End: 2022-08-18

## 2022-08-18 DIAGNOSIS — Z11.1 SCREENING-PULMONARY TB: ICD-10-CM

## 2022-08-18 DIAGNOSIS — Z13.6 ENCOUNTER FOR LIPID SCREENING FOR CARDIOVASCULAR DISEASE: ICD-10-CM

## 2022-08-18 DIAGNOSIS — Z13.220 ENCOUNTER FOR LIPID SCREENING FOR CARDIOVASCULAR DISEASE: ICD-10-CM

## 2022-08-18 DIAGNOSIS — Z11.59 NEED FOR HEPATITIS C SCREENING TEST: ICD-10-CM

## 2022-08-18 DIAGNOSIS — Z11.4 SCREENING FOR HIV (HUMAN IMMUNODEFICIENCY VIRUS): ICD-10-CM

## 2022-08-18 LAB
CHOLEST SERPL-MCNC: 161 MG/DL
HCV AB SER QL: NORMAL
HDLC SERPL-MCNC: 51 MG/DL
LDLC SERPL CALC-MCNC: 97 MG/DL (ref 0–100)
NONHDLC SERPL-MCNC: 110 MG/DL
TRIGL SERPL-MCNC: 64 MG/DL

## 2022-08-18 PROCEDURE — 36415 COLL VENOUS BLD VENIPUNCTURE: CPT

## 2022-08-18 PROCEDURE — 86480 TB TEST CELL IMMUN MEASURE: CPT

## 2022-08-18 PROCEDURE — 86803 HEPATITIS C AB TEST: CPT

## 2022-08-18 PROCEDURE — 87389 HIV-1 AG W/HIV-1&-2 AB AG IA: CPT

## 2022-08-18 PROCEDURE — 80061 LIPID PANEL: CPT

## 2022-08-18 NOTE — TELEPHONE ENCOUNTER
Called patient, Tried scheduling GYN but patient refused  Going away to college  Will contact office to schedule when available

## 2022-08-18 NOTE — TELEPHONE ENCOUNTER
----- Message from Jeremias Benitez DO sent at 8/17/2022  2:23 PM EDT -----  Regarding: Return for GYN well women exam  Hello,  Please call pt to schedule a Well Women exam with pap smear to test for cervical cancer  Thank you!

## 2022-08-19 LAB
GAMMA INTERFERON BACKGROUND BLD IA-ACNC: 0.04 IU/ML
HIV 1+2 AB+HIV1 P24 AG SERPL QL IA: NORMAL
M TB IFN-G BLD-IMP: NEGATIVE
M TB IFN-G CD4+ BCKGRND COR BLD-ACNC: 0 IU/ML
M TB IFN-G CD4+ BCKGRND COR BLD-ACNC: 0 IU/ML
MITOGEN IGNF BCKGRD COR BLD-ACNC: >10 IU/ML

## 2023-02-14 ENCOUNTER — OFFICE VISIT (OUTPATIENT)
Dept: FAMILY MEDICINE CLINIC | Facility: CLINIC | Age: 23
End: 2023-02-14

## 2023-02-14 VITALS
HEART RATE: 112 BPM | WEIGHT: 131 LBS | DIASTOLIC BLOOD PRESSURE: 84 MMHG | RESPIRATION RATE: 20 BRPM | HEIGHT: 63 IN | BODY MASS INDEX: 23.21 KG/M2 | OXYGEN SATURATION: 99 % | TEMPERATURE: 97.9 F | SYSTOLIC BLOOD PRESSURE: 122 MMHG

## 2023-02-14 DIAGNOSIS — A08.4 VIRAL GASTROENTERITIS: Primary | ICD-10-CM

## 2023-02-14 NOTE — ASSESSMENT & PLAN NOTE
- Acute complaints of vomiting and diarrhea  Onset X1 day ago  Today, no vomiting or diarrhea  Vital stable  Patient afebrile  Denies red flag symptoms   - Suspect viral gastroenteritis  -Encourage adequate hydration  Return to care if symptoms not improving or with systemic symptoms   -Patient request work excuse for today    Excuse given

## 2023-02-14 NOTE — LETTER
February 14, 2023     Patient: Yesika Russ  YOB: 2000  Date of Visit: 2/14/2023      To Whom it May Concern:    Noemi Juarez is under my professional care  Martha Joya was seen in my office on 2/14/2023  Martha Joya may return to work on 02/15/2023  If you have any questions or concerns, please don't hesitate to call           Sincerely,          Hina Paredes MD        CC: No Recipients

## 2023-02-14 NOTE — PROGRESS NOTES
Name: Vance Gonzales      : 2000      MRN: [id number]  Encounter Provider: Bobby Panda MD  Encounter Date: 2023   Encounter department: [address] [place] [address]  Viral gastroenteritis  Assessment & Plan:  - Acute complaints of vomiting and diarrhea  Onset X1 day ago  Today, no vomiting or diarrhea  Vital stable  Patient afebrile  Denies red flag symptoms   - Suspect viral gastroenteritis  -Encourage adequate hydration  Return to care if symptoms not improving or with systemic symptoms   -Patient request work excuse for today  Excuse given           Subjective      Patient presents to the clinic due to acute complains of diarrhea and vomiting  Onset was yesterday  She states that in the last 24 hours she vomittied 4 times and had multiple episodes of diarrhea    Today, she reports no vomiting or diarrhea  She reports also starting her menstrual periods  She endorses some mild cramping secondary to menstrual pain  She denies any red flag symptoms including hematuria,  hematochezia, or any systemic symptoms  She denies trying any new food or eating out in a restaurant  Review of Systems   Constitutional: Negative for activity change, appetite change, chills and fever  Respiratory: Negative for shortness of breath  Cardiovascular: Negative for chest pain and palpitations  Gastrointestinal: Positive for abdominal pain, diarrhea and vomiting  Genitourinary: Negative for difficulty urinating and hematuria  No current outpatient medications on file prior to visit  Objective     /84 (BP Location: Left arm, Patient Position: Sitting, Cuff Size: Standard)   Pulse (!) 112   Temp 97 9 °F (36 6 °C) (Temporal)   Resp 20   Ht 5' 3" (1 6 m)   Wt 59 4 kg (131 lb)   SpO2 99%   BMI 23 21 kg/m²     Physical Exam  Vitals reviewed  Constitutional:       General: She is not in acute distress       Appearance: Normal appearance  She is not ill-appearing, toxic-appearing or diaphoretic  HENT:      Head: Normocephalic and atraumatic  Eyes:      General:         Right eye: No discharge  Left eye: No discharge  Conjunctiva/sclera: Conjunctivae normal    Cardiovascular:      Rate and Rhythm: Normal rate and regular rhythm  Pulses: Normal pulses  Heart sounds: Normal heart sounds  Pulmonary:      Effort: Pulmonary effort is normal  No respiratory distress  Breath sounds: Normal breath sounds  No wheezing  Abdominal:      General: Abdomen is flat  Bowel sounds are normal  There is no distension  Palpations: Abdomen is soft  Tenderness: There is no abdominal tenderness  There is no guarding  Comments: + Benign abdominal exam     Musculoskeletal:         General: Normal range of motion  Right lower leg: No edema  Left lower leg: No edema  Skin:     General: Skin is warm and dry  Neurological:      Mental Status: She is alert  Mental status is at baseline  Psychiatric:         Mood and Affect: Mood normal          Behavior: Behavior normal          Thought Content:  Thought content normal          Judgment: Judgment normal        Arian Horton MD

## 2023-04-15 PROBLEM — A08.4 VIRAL GASTROENTERITIS: Status: RESOLVED | Noted: 2023-02-14 | Resolved: 2023-04-15

## 2023-06-27 PROBLEM — Z02.89 ENCOUNTER FOR PHYSICAL EXAMINATION RELATED TO EMPLOYMENT: Status: ACTIVE | Noted: 2019-06-19

## 2023-06-30 ENCOUNTER — TELEPHONE (OUTPATIENT)
Dept: FAMILY MEDICINE CLINIC | Facility: CLINIC | Age: 23
End: 2023-06-30

## 2023-06-30 NOTE — TELEPHONE ENCOUNTER
Spoke with pt to confirm her intent to with PCP's  Pt requests to cancel appt at this time  Pt will take physical form for work to her PCP's office for completion since, her most recent physical was in 8/2023

## 2023-09-22 NOTE — TELEPHONE ENCOUNTER
Called patient and left her a message that her completed form is in the front office bin and she can pick it up  Pt's son Girma calls to say he wanted DR. Ocampo to be aware of Janet being admitted into [facility] for confusion.  Pts son states that the confusion  came on quickly and they are working her up with Cat scan of head and they are  Waiting for MRI brain.  Instructed son Girma to call with any questions or concerns.  Message forwarded to Dr. Ocampo inbox.   No

## 2024-02-21 PROBLEM — J06.9 ACUTE UPPER RESPIRATORY INFECTION: Status: RESOLVED | Noted: 2018-04-09 | Resolved: 2024-02-21

## 2024-03-11 ENCOUNTER — OFFICE VISIT (OUTPATIENT)
Dept: FAMILY MEDICINE CLINIC | Facility: CLINIC | Age: 24
End: 2024-03-11

## 2024-03-11 VITALS
TEMPERATURE: 98 F | HEIGHT: 64 IN | RESPIRATION RATE: 16 BRPM | HEART RATE: 118 BPM | DIASTOLIC BLOOD PRESSURE: 77 MMHG | WEIGHT: 154.4 LBS | BODY MASS INDEX: 26.36 KG/M2 | SYSTOLIC BLOOD PRESSURE: 113 MMHG

## 2024-03-11 DIAGNOSIS — J06.9 UPPER RESPIRATORY TRACT INFECTION, UNSPECIFIED TYPE: Primary | ICD-10-CM

## 2024-03-11 PROCEDURE — 99213 OFFICE O/P EST LOW 20 MIN: CPT | Performed by: FAMILY MEDICINE

## 2024-03-11 RX ORDER — BENZONATATE 100 MG/1
100 CAPSULE ORAL 3 TIMES DAILY PRN
Qty: 20 CAPSULE | Refills: 0 | Status: SHIPPED | OUTPATIENT
Start: 2024-03-11

## 2024-03-11 NOTE — PROGRESS NOTES
Name: Lupe August      : 2000      MRN: [id number]  Encounter Provider: Melissa Ortega MD  Encounter Date: 3/11/2024   Encounter department: [facility]    Assessment & Plan     1. Upper respiratory tract infection, unspecified type  Assessment & Plan:  23 y.o female with no significant PMH presenting with symptoms of runny nose, sore throat and ear pain for 2 days.  - Symptoms have stayed the same since two days  - Subjective fever last night  - Patient tried OTC Night Quill without cough suppressant with mild relief of symptoms  -Noted erythema in the right ear but no bulging membrane  Differential  URI with viral etiology vs bacterial etiology  Due to less severity of symptoms and no objective fevers symptoms are suspicious for viral URI  Plan  - Symptomatically treat with Tylenol OTC for pain control, Tessalon pearls 100 mg TID PRN for cough and adequate hydration, nutrition and sleep   - Patient is to return if symptoms worsen     Orders:  -     benzonatate (TESSALON PERLES) 100 mg capsule; Take 1 capsule (100 mg total) by mouth 3 (three) times a day as needed for cough           Subjective      HPI Lupe August is a 23 y.o. female with no significant PMHx presenting with symptoms of runny nose, sore throat, ear pain and subjective fever for 2 days. She denies cough, nausea, vomiting, diarrhea, chills and SOB. She works as a  and has exposure to sick children with Strep throat and pertussis. She has tried OTC Night Quill without cough suppressant with mild relief of symptoms.   Review of Systems   Constitutional: Negative.    HENT:  Positive for congestion, ear pain, postnasal drip, rhinorrhea and sore throat. Negative for ear discharge, sinus pressure, sinus pain and voice change.    Eyes: Negative.    Respiratory: Negative.  Negative for cough.    Cardiovascular: Negative.    Gastrointestinal: Negative.    Endocrine: Negative.   "  Genitourinary: Negative.    Musculoskeletal: Negative.    Skin: Negative.    Allergic/Immunologic: Negative.    Neurological: Negative.    Hematological: Negative.    Psychiatric/Behavioral: Negative.         No current outpatient medications on file prior to visit.       Objective     /77 (BP Location: Left arm, Patient Position: Sitting, Cuff Size: Adult)   Pulse (!) 118   Temp 98 °F (36.7 °C)   Resp 16   Ht 5' 3.8\" (1.621 m)   Wt 70 kg (154 lb 6.4 oz)   BMI 26.67 kg/m²     Physical Exam  Constitutional:       Appearance: Normal appearance. She is normal weight.   HENT:      Head: Normocephalic and atraumatic.      Right Ear: Hearing and external ear normal. Tenderness present. Tympanic membrane is erythematous. Tympanic membrane is not retracted or bulging. Tympanic membrane has normal mobility.      Left Ear: Hearing, tympanic membrane and external ear normal. No tenderness. Tympanic membrane is not erythematous, retracted or bulging. Tympanic membrane has normal mobility.      Nose: Nose normal.      Mouth/Throat:      Mouth: Mucous membranes are moist.      Pharynx: Oropharynx is clear. Posterior oropharyngeal erythema present.   Cardiovascular:      Rate and Rhythm: Normal rate and regular rhythm.   Abdominal:      General: Abdomen is flat. Bowel sounds are normal.   Musculoskeletal:         General: Normal range of motion.      Cervical back: Normal range of motion.   Skin:     General: Skin is warm and dry.      Capillary Refill: Capillary refill takes less than 2 seconds.   Neurological:      General: No focal deficit present.      Mental Status: She is alert and oriented to person, place, and time. Mental status is at baseline.   Psychiatric:         Mood and Affect: Mood normal.         Behavior: Behavior normal.         Thought Content: Thought content normal.         Judgment: Judgment normal.       Melissa Ortega MD    "

## 2024-03-11 NOTE — LETTER
March 11, 2024     Patient: Lupe August  YOB: 2000  Date of Visit: 3/11/2024      To Whom it May Concern:    Lupe August is under my professional care. Lupe was seen in my office on 3/11/2024. Lupe may return to work on 3/12/24 .    If you have any questions or concerns, please don't hesitate to call.         Sincerely,          Melissa Ortega MD        CC: No Recipients

## 2024-03-11 NOTE — ASSESSMENT & PLAN NOTE
23 y.o female with no significant PMH presenting with symptoms of runny nose, sore throat and ear pain for 2 days.  - Symptoms have stayed the same since two days  - Subjective fever last night  - Patient tried OTC Night Quill without cough suppressant with mild relief of symptoms  -Noted erythema in the right ear but no bulging membrane  Differential  URI with viral etiology vs bacterial etiology  Due to less severity of symptoms and no objective fevers symptoms are suspicious for viral URI  Plan  - Symptomatically treat with Tylenol OTC for pain control, Tessalon pearls 100 mg TID PRN for cough and adequate hydration, nutrition and sleep   - Patient is to return if symptoms worsen

## 2024-04-01 ENCOUNTER — OFFICE VISIT (OUTPATIENT)
Dept: FAMILY MEDICINE CLINIC | Facility: CLINIC | Age: 24
End: 2024-04-01

## 2024-04-01 VITALS
WEIGHT: 148.6 LBS | HEIGHT: 64 IN | HEART RATE: 87 BPM | SYSTOLIC BLOOD PRESSURE: 99 MMHG | DIASTOLIC BLOOD PRESSURE: 68 MMHG | TEMPERATURE: 98.5 F | BODY MASS INDEX: 25.37 KG/M2 | OXYGEN SATURATION: 97 % | RESPIRATION RATE: 18 BRPM

## 2024-04-01 DIAGNOSIS — Z71.3 NUTRITIONAL COUNSELING: ICD-10-CM

## 2024-04-01 DIAGNOSIS — R10.84 GENERALIZED ABDOMINAL PAIN: ICD-10-CM

## 2024-04-01 DIAGNOSIS — Z12.4 SCREENING FOR CERVICAL CANCER: ICD-10-CM

## 2024-04-01 DIAGNOSIS — Z71.82 EXERCISE COUNSELING: ICD-10-CM

## 2024-04-01 DIAGNOSIS — Z00.00 ANNUAL PHYSICAL EXAM: Primary | ICD-10-CM

## 2024-04-01 PROCEDURE — 99395 PREV VISIT EST AGE 18-39: CPT | Performed by: FAMILY MEDICINE

## 2024-04-01 NOTE — ASSESSMENT & PLAN NOTE
Continues to have chronic abdominal pain; follows with GI but last seen ~2 years ago.   - Rec'd following up with GI

## 2024-04-01 NOTE — PROGRESS NOTES
ADULT ANNUAL PHYSICAL  [facility] [place]    NAME: Lupe August  AGE: 23 y.o. SEX: female  : 2000     DATE: 2024     Assessment and Plan:     Problem List Items Addressed This Visit          Surgery/Wound/Pain    Abdominal pain     Continues to have chronic abdominal pain; follows with GI but last seen ~2 years ago.   - Rec'd following up with GI            Obstetrics/Gynecology    Screening for cervical cancer     Follows with OB/GYN.   - Will continue screening with her ob/gyn provider             Other    Annual physical exam - Primary    BMI 25.0-25.9,adult     Discussed importance of regular physical activity and healthy dietary choices.  - Recommended 150 minutes of moderate physical activity weekly   - Recommended patient eat well balanced diet with focus on fruits, vegetable, whole grains, and fiber          Exercise counseling    Nutritional counseling         Immunizations and preventive care screenings were discussed with patient today. Appropriate education was printed on patient's after visit summary.    Counseling:  Alcohol/drug use: discussed moderation in alcohol intake, the recommendations for healthy alcohol use, and avoidance of illicit drug use.  Dental Health: discussed importance of regular tooth brushing, flossing, and dental visits.  Injury prevention: discussed safety/seat belts, safety helmets, smoke detectors, carbon dioxide detectors, and smoking near bedding or upholstery.  Sexual health: discussed sexually transmitted diseases, partner selection, use of condoms, avoidance of unintended pregnancy, and contraceptive alternatives.  Exercise: the importance of regular exercise/physical activity was discussed. Recommend exercise 3-5 times per week for at least 30 minutes.     BMI Counseling: Body mass index is 25.67 kg/m². The BMI is above normal. Nutrition recommendations include encouraging healthy choices  of fruits and vegetables and limiting drinks that contain sugar. Exercise recommendations include moderate physical activity 150 minutes/week. Patient referred to PCP. Rationale for BMI follow-up plan is due to patient being overweight or obese.         Return in 1 year (on 4/1/2025) for Annual physical.     Chief Complaint:     Chief Complaint   Patient presents with    Physical Exam      History of Present Illness:     Adult Annual Physical   Patient here for a comprehensive physical exam. The patient reports  no new problems .    Diet and Physical Activity  Diet/Nutrition: well balanced diet and consuming 3-5 servings of fruits/vegetables daily.   Exercise: walking, 3-4 times a week on average, and less than 30 minutes on average.      Depression Screening  PHQ-2/9 Depression Screening    Little interest or pleasure in doing things: 0 - not at all  Feeling down, depressed, or hopeless: 0 - not at all       General Health  Sleep: sleeps well and gets 7-8 hours of sleep on average.   Hearing: normal - right.  Vision: no vision problems.   Dental: regular dental visits.       /GYN Health  Follows with gynecology? yes   Last menstrual period: regular periods. End of last cycle: 3/30/24  Contraceptive method:  None .  History of STDs?: no.     Advanced Care Planning  Do you have an advanced directive? no  Do you have a durable medical power of ? no  ACP document given to the patient? yes      Review of Systems:     Review of Systems   Constitutional:  Negative for chills and fever.   HENT:  Negative for ear pain and sore throat.    Eyes:  Negative for pain and visual disturbance.   Respiratory:  Negative for cough and shortness of breath.    Cardiovascular:  Negative for chest pain and palpitations.   Gastrointestinal:  Negative for abdominal pain and vomiting.   Genitourinary:  Negative for dysuria and hematuria.   Musculoskeletal:  Negative for arthralgias and back pain.   Skin:  Negative for color change  and rash.   Neurological:  Negative for seizures and syncope.   All other systems reviewed and are negative.     Past Medical History:     Past Medical History:   Diagnosis Date    Concussion     last assessed: 10/29/2014      Past Surgical History:     Past Surgical History:   Procedure Laterality Date    EGD AND COLONOSCOPY  2021    FL EGD TRANSORAL BIOPSY SINGLE/MULTIPLE N/A 02/28/2017    Procedure: ESOPHAGOGASTRODUODENOSCOPY (EGD);  Surgeon: Kobe Serrano MD;  Location:  GI LAB;  Service: Pediatric Gastrointestinal    UPPER GASTROINTESTINAL ENDOSCOPY  2017      Social History:     Social History     Socioeconomic History    Marital status: Single     Spouse name: None    Number of children: None    Years of education: None    Highest education level: None   Occupational History    None   Tobacco Use    Smoking status: Never    Smokeless tobacco: Never   Vaping Use    Vaping status: Never Used   Substance and Sexual Activity    Alcohol use: Yes     Comment: Socially    Drug use: No    Sexual activity: Never   Other Topics Concern    None   Social History Narrative    Patient lives with mom,dad, and brother     Always wears seatbelt in car      Social Determinants of Health     Financial Resource Strain: Low Risk  (3/11/2024)    Overall Financial Resource Strain (CARDIA)     Difficulty of Paying Living Expenses: Not hard at all   Food Insecurity: No Food Insecurity (3/11/2024)    Hunger Vital Sign     Worried About Running Out of Food in the Last Year: Never true     Ran Out of Food in the Last Year: Never true   Transportation Needs: No Transportation Needs (3/11/2024)    PRAPARE - Transportation     Lack of Transportation (Medical): No     Lack of Transportation (Non-Medical): No   Physical Activity: Insufficiently Active (3/11/2024)    Exercise Vital Sign     Days of Exercise per Week: 2 days     Minutes of Exercise per Session: 30 min   Stress: No Stress Concern Present (3/11/2024)    [facility] of  "Occupational Health - Occupational Stress Questionnaire     Feeling of Stress : Not at all   Social Connections: Not on file   Intimate Partner Violence: Not At Risk (3/11/2024)    Humiliation, Afraid, Rape, and Kick questionnaire     Fear of Current or Ex-Partner: No     Emotionally Abused: No     Physically Abused: No     Sexually Abused: No   Housing Stability: Low Risk  (3/11/2024)    Housing Stability Vital Sign     Unable to Pay for Housing in the Last Year: No     Number of Places Lived in the Last Year: 1     Unstable Housing in the Last Year: No      Family History:     Family History   Problem Relation Age of Onset    Thyroid nodules Mother     Migraines Mother     Ovarian cancer Mother     Thyroid disease Mother     Kidney disease Father     Thyroid nodules Maternal Grandmother     Brain cancer Maternal Grandmother     Cancer Maternal Grandmother     Stomach cancer Paternal Grandmother     Cancer Paternal Grandmother     Brain cancer Family     Breast cancer Family         Great Aunts    Stomach cancer Family     Hypertension Family       Current Medications:     Current Outpatient Medications   Medication Sig Dispense Refill    benzonatate (TESSALON PERLES) 100 mg capsule Take 1 capsule (100 mg total) by mouth 3 (three) times a day as needed for cough 20 capsule 0     No current facility-administered medications for this visit.      Allergies:     No Known Allergies   Physical Exam:     BP 99/68   Pulse 87   Temp 98.5 °F (36.9 °C)   Resp 18   Ht 5' 3.8\" (1.621 m)   Wt 67.4 kg (148 lb 9.6 oz)   SpO2 97%   BMI 25.67 kg/m²     Physical Exam  Vitals and nursing note reviewed.   Constitutional:       General: She is not in acute distress.     Appearance: She is well-developed.   HENT:      Head: Normocephalic and atraumatic.      Right Ear: External ear normal.      Left Ear: External ear normal.      Nose: Nose normal.      Mouth/Throat:      Mouth: Mucous membranes are moist.      Pharynx: Oropharynx " is clear. No oropharyngeal exudate or posterior oropharyngeal erythema.   Eyes:      Extraocular Movements: Extraocular movements intact.      Conjunctiva/sclera: Conjunctivae normal.   Cardiovascular:      Rate and Rhythm: Normal rate and regular rhythm.      Heart sounds: No murmur heard.  Pulmonary:      Effort: Pulmonary effort is normal. No respiratory distress.      Breath sounds: Normal breath sounds.   Abdominal:      General: Bowel sounds are normal. There is no distension.      Palpations: Abdomen is soft. There is no mass.      Tenderness: There is no abdominal tenderness.      Hernia: No hernia is present.   Musculoskeletal:         General: No swelling.   Lymphadenopathy:      Cervical: No cervical adenopathy.   Skin:     General: Skin is warm.      Capillary Refill: Capillary refill takes less than 2 seconds.      Findings: No rash.   Neurological:      Mental Status: She is alert and oriented to person, place, and time.      Sensory: No sensory deficit.      Motor: No weakness.   Psychiatric:         Mood and Affect: Mood normal.         Behavior: Behavior normal.          Harry Kiran MD   [facility]

## 2024-04-01 NOTE — ASSESSMENT & PLAN NOTE
Discussed importance of regular physical activity and healthy dietary choices.  - Recommended 150 minutes of moderate physical activity weekly   - Recommended patient eat well balanced diet with focus on fruits, vegetable, whole grains, and fiber

## 2024-04-26 ENCOUNTER — TELEPHONE (OUTPATIENT)
Dept: FAMILY MEDICINE CLINIC | Facility: CLINIC | Age: 24
End: 2024-04-26

## 2024-04-26 ENCOUNTER — OFFICE VISIT (OUTPATIENT)
Dept: FAMILY MEDICINE CLINIC | Facility: CLINIC | Age: 24
End: 2024-04-26

## 2024-04-26 VITALS
OXYGEN SATURATION: 98 % | RESPIRATION RATE: 18 BRPM | TEMPERATURE: 100.4 F | HEART RATE: 130 BPM | SYSTOLIC BLOOD PRESSURE: 116 MMHG | DIASTOLIC BLOOD PRESSURE: 81 MMHG | HEIGHT: 64 IN | WEIGHT: 145.4 LBS | BODY MASS INDEX: 24.82 KG/M2

## 2024-04-26 DIAGNOSIS — J02.0 STREP PHARYNGITIS: Primary | ICD-10-CM

## 2024-04-26 LAB — S PYO AG THROAT QL: POSITIVE

## 2024-04-26 PROCEDURE — 3725F SCREEN DEPRESSION PERFORMED: CPT | Performed by: FAMILY MEDICINE

## 2024-04-26 PROCEDURE — 87880 STREP A ASSAY W/OPTIC: CPT | Performed by: FAMILY MEDICINE

## 2024-04-26 PROCEDURE — 99213 OFFICE O/P EST LOW 20 MIN: CPT | Performed by: FAMILY MEDICINE

## 2024-04-26 RX ORDER — AMOXICILLIN 500 MG/1
500 TABLET, FILM COATED ORAL 2 TIMES DAILY
Qty: 10 TABLET | Refills: 0 | Status: SHIPPED | OUTPATIENT
Start: 2024-04-26 | End: 2024-05-01

## 2024-04-26 NOTE — ASSESSMENT & PLAN NOTE
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 Rapid strep + in office. Sent amoxicillin 500mg BID for 5 days, return precautions discussed, patient may follow up PRN.

## 2024-04-26 NOTE — PROGRESS NOTES
Name: Lupe August      : 2000      MRN: [id number]  Encounter Provider: Karl Mike MD  Encounter Date: 2024   [facility]    Assessment & Plan     1. 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 Rapid strep + in office. Sent amoxicillin 500mg BID for 5 days, return precautions discussed, patient may follow up PRN.     Orders:  -     amoxicillin (AMOXIL) 500 MG tablet; Take 1 tablet (500 mg total) by mouth 2 (two) times a day for 5 days           Subjective      Sore Throat   This is a new problem. The current episode started yesterday. The problem has been gradually improving. Neither side of throat is experiencing more pain than the other. The maximum temperature recorded prior to her arrival was 101 - 101.9 F. The fever has been present for Less than 1 day. The pain is at a severity of 4/10. The pain is moderate. Associated symptoms include vomiting. Pertinent negatives include no congestion, headaches, shortness of breath or trouble swallowing. She has had exposure to strep. She has tried nothing for the symptoms.     Review of Systems   Constitutional:  Positive for fatigue and fever.   HENT:  Positive for sore throat. Negative for congestion, trouble swallowing and voice change.    Eyes:  Negative for visual disturbance.   Respiratory:  Negative for chest tightness and shortness of breath.    Cardiovascular:  Negative for chest pain.   Gastrointestinal:  Positive for vomiting.   Musculoskeletal:  Positive for myalgias.   Skin:  Negative for rash.   Neurological:  Negative for light-headedness and headaches.       Current Outpatient Medications on File Prior to Visit   Medication Sig    [DISCONTINUED] benzonatate (TESSALON PERLES) 100 mg capsule Take 1 capsule (100 mg total) by mouth 3 (three) times a day as needed for cough  "      Objective     /81 (BP Location: Left arm, Patient Position: Sitting, Cuff Size: Standard)   Pulse (!) 130   Temp 100.4 °F (38 °C) (Temporal)   Resp 18   Ht 5' 3.8\" (1.621 m)   Wt 66 kg (145 lb 6.4 oz)   LMP 03/30/2024 (Approximate)   SpO2 98%   BMI 25.11 kg/m²     Physical Exam  Constitutional:       General: She is not in acute distress.     Appearance: Normal appearance. She is normal weight.   HENT:      Head: Normocephalic and atraumatic.      Mouth/Throat:      Mouth: Mucous membranes are moist.      Pharynx: Uvula midline. Posterior oropharyngeal erythema present. No uvula swelling.      Tonsils: Tonsillar exudate present. 2+ on the right. 2+ on the left.   Cardiovascular:      Rate and Rhythm: Normal rate and regular rhythm.      Heart sounds: Normal heart sounds. No murmur heard.  Pulmonary:      Effort: No respiratory distress.      Breath sounds: Normal breath sounds. No wheezing.   Abdominal:      General: There is no distension.      Palpations: Abdomen is soft.   Musculoskeletal:         General: Normal range of motion.      Cervical back: Normal range of motion.   Skin:     General: Skin is warm and dry.   Neurological:      General: No focal deficit present.      Mental Status: She is alert. Mental status is at baseline.   Psychiatric:         Mood and Affect: Mood normal.       Karl Mike MD    "

## 2024-04-26 NOTE — TELEPHONE ENCOUNTER
Patient called with sore throat, yesterday this started stated that tonsils are also swollen also is with nausea and abd pain, apt made with provider Rashid for 240pm today 2/26/24

## 2024-05-01 PROBLEM — Z12.4 SCREENING FOR CERVICAL CANCER: Status: RESOLVED | Noted: 2024-04-01 | Resolved: 2024-05-01

## 2025-05-16 ENCOUNTER — TELEPHONE (OUTPATIENT)
Dept: FAMILY MEDICINE CLINIC | Facility: CLINIC | Age: 25
End: 2025-05-16

## 2025-05-29 ENCOUNTER — OFFICE VISIT (OUTPATIENT)
Dept: URGENT CARE | Facility: CLINIC | Age: 25
End: 2025-05-29
Payer: COMMERCIAL

## 2025-05-29 VITALS
DIASTOLIC BLOOD PRESSURE: 76 MMHG | HEIGHT: 64 IN | BODY MASS INDEX: 27.31 KG/M2 | WEIGHT: 160 LBS | SYSTOLIC BLOOD PRESSURE: 128 MMHG | HEART RATE: 120 BPM | TEMPERATURE: 100.2 F | OXYGEN SATURATION: 98 % | RESPIRATION RATE: 18 BRPM

## 2025-05-29 DIAGNOSIS — J02.9 ACUTE PHARYNGITIS, UNSPECIFIED ETIOLOGY: Primary | ICD-10-CM

## 2025-05-29 PROCEDURE — 99203 OFFICE O/P NEW LOW 30 MIN: CPT

## 2025-05-29 RX ORDER — AZITHROMYCIN 250 MG/1
TABLET, FILM COATED ORAL
Qty: 6 TABLET | Refills: 0 | Status: SHIPPED | OUTPATIENT
Start: 2025-05-29 | End: 2025-06-02

## 2025-05-29 RX ORDER — BENZONATATE 200 MG/1
200 CAPSULE ORAL 3 TIMES DAILY PRN
Qty: 20 CAPSULE | Refills: 0 | Status: SHIPPED | OUTPATIENT
Start: 2025-05-29

## 2025-05-29 NOTE — PROGRESS NOTES
"Name: Lupe August      : 2000      MRN: [id number]  Encounter Provider: Mera Baird PA-C  Encounter Date: 2025   Encounter department: [facility]  :  Assessment & Plan  Acute pharyngitis, unspecified etiology    Orders:    azithromycin (ZITHROMAX) 250 mg tablet; Take 2 tablets today then 1 tablet daily x 4 days    benzonatate (TESSALON) 200 MG capsule; Take 1 capsule (200 mg total) by mouth 3 (three) times a day as needed for cough    Reviewed concern for strep given exposure, exam, and symptoms. Tessalon pearls also sent for cough management.    History of Present Illness   HPI  Lupe August is a 25 y.o. female who presents with c/o cough and chest congestion that started 4 days ago. She is a .      Review of Systems   Constitutional:  Positive for fever.   HENT:  Positive for sore throat.    Respiratory:  Positive for cough.           Objective   /76   Pulse (!) 120   Temp 100.2 °F (37.9 °C) (Temporal)   Resp 18   Ht 5' 4\" (1.626 m)   Wt 72.6 kg (160 lb)   LMP 2025 (Exact Date)   SpO2 98%   BMI 27.46 kg/m²      Physical Exam  Constitutional:       Appearance: She is ill-appearing.   HENT:      Head: Normocephalic and atraumatic.      Nose: Congestion present.     Cardiovascular:      Rate and Rhythm: Normal rate.   Pulmonary:      Effort: Pulmonary effort is normal.     Neurological:      Mental Status: She is alert.           "

## 2025-05-30 NOTE — ASSESSMENT & PLAN NOTE
Orders:    azithromycin (ZITHROMAX) 250 mg tablet; Take 2 tablets today then 1 tablet daily x 4 days    benzonatate (TESSALON) 200 MG capsule; Take 1 capsule (200 mg total) by mouth 3 (three) times a day as needed for cough

## (undated) DEVICE — SINGLE-USE BIOPSY FORCEPS: Brand: RADIAL JAW 4